# Patient Record
Sex: MALE | URBAN - METROPOLITAN AREA
[De-identification: names, ages, dates, MRNs, and addresses within clinical notes are randomized per-mention and may not be internally consistent; named-entity substitution may affect disease eponyms.]

---

## 2017-10-20 ENCOUNTER — APPOINTMENT (RX ONLY)
Dept: URBAN - METROPOLITAN AREA CLINIC 126 | Facility: CLINIC | Age: 67
Setting detail: DERMATOLOGY
End: 2017-10-20

## 2017-10-20 DIAGNOSIS — Z41.9 ENCOUNTER FOR PROCEDURE FOR PURPOSES OTHER THAN REMEDYING HEALTH STATE, UNSPECIFIED: ICD-10-CM

## 2017-10-20 PROCEDURE — ? RECOMMENDATIONS

## 2017-10-20 PROCEDURE — ? MEDICAL CONSULTATION: BROWN SPOTS

## 2017-10-20 PROCEDURE — 99025: CPT

## 2017-11-06 ENCOUNTER — APPOINTMENT (RX ONLY)
Dept: URBAN - METROPOLITAN AREA CLINIC 126 | Facility: CLINIC | Age: 67
Setting detail: DERMATOLOGY
End: 2017-11-06

## 2017-11-06 DIAGNOSIS — Z41.9 ENCOUNTER FOR PROCEDURE FOR PURPOSES OTHER THAN REMEDYING HEALTH STATE, UNSPECIFIED: ICD-10-CM

## 2017-11-06 PROCEDURE — ? SCITON BBL

## 2017-11-06 PROCEDURE — ? DIAGNOSIS COMMENT

## 2017-11-06 ASSESSMENT — LOCATION SIMPLE DESCRIPTION DERM
LOCATION SIMPLE: LEFT HAND
LOCATION SIMPLE: RIGHT CHEEK
LOCATION SIMPLE: RIGHT HAND
LOCATION SIMPLE: LEFT CHEEK

## 2017-11-06 ASSESSMENT — LOCATION DETAILED DESCRIPTION DERM
LOCATION DETAILED: RIGHT CENTRAL MALAR CHEEK
LOCATION DETAILED: LEFT SUPERIOR CENTRAL MALAR CHEEK
LOCATION DETAILED: RIGHT RADIAL DORSAL HAND
LOCATION DETAILED: LEFT ULNAR DORSAL HAND

## 2017-11-06 ASSESSMENT — LOCATION ZONE DERM
LOCATION ZONE: HAND
LOCATION ZONE: FACE

## 2017-11-06 NOTE — PROCEDURE: SCITON BBL
Cooling (In C): 25
Post Procedure Text: The patient tolerated the procedure well. Post care was reviewed with the patient. The patient understands multiple treatments are necessary.
Pulse Duration: [address] [facility]
Repetition Rate (Hz): 10
Pulse Duration: 20
Passes: 1
Cooling ?: Yes
Cooling (In C): 15
Pulse Duration Units: milliseconds
Preprocedure Text: Proper eye protection was applied. The treatment areas were thoroughly cleaned. Clear ultrasound gel was applied to the treatment area. The area was treated with no immediate stacking of pulses. End treatment erythema was as expected.
Consent: Written consent obtained, risks reviewed including but not limited to crusting, scabbing, blistering, scarring, darker or lighter pigmentary change, bruising, and/or incomplete response.
Spot Size: Finesse Adapter Size: 15 x 15 mm square
Treated Area: large area
Anesthesia Volume In Cc: 0
Detail Level: Zone
Post-Care Instructions: I reviewed with the patient in detail post-care instructions. Patient should stay away from the sun and wear sun protection until treated areas are fully healed.
Fluence (J/Cm2): 12
Anesthesia Type: 1% lidocaine with epinephrine
Spot Size: Finesse Adapter Size: 11 mm round
Length Of Topical Anesthesia Application (Optional): 15 minutes
Post Procedure Text: The patient tolerated the procedure well.  Post care was reviewed with the patient. The patient understands multiple treatments are necessary.
Topical Anesthesia?: BLT cream (benzocaine 20%, lidocaine 6%, tetracaine 4%)
Spot Size: Finesse Adapter Size: 15 x 45 mm (No Finesse Adapter)
Additional Comments (Optional): 9/15/515/15Â° on distal FAs
Preprocedure Text: Proper eye protection was applied.  The treatment areas were thoroughly cleaned.  Clear ultrasound gel was applied to the treatment area. The area was treated with no immediate stacking of pulses.  End treatment erythema was as expected.

## 2017-11-29 ENCOUNTER — APPOINTMENT (RX ONLY)
Dept: URBAN - METROPOLITAN AREA CLINIC 126 | Facility: CLINIC | Age: 67
Setting detail: DERMATOLOGY
End: 2017-11-29

## 2017-11-29 DIAGNOSIS — Z41.9 ENCOUNTER FOR PROCEDURE FOR PURPOSES OTHER THAN REMEDYING HEALTH STATE, UNSPECIFIED: ICD-10-CM

## 2017-11-29 PROCEDURE — 99025: CPT

## 2017-11-29 PROCEDURE — ? MEDICAL CONSULTATION: BROWN SPOTS

## 2017-12-21 ENCOUNTER — APPOINTMENT (RX ONLY)
Dept: URBAN - METROPOLITAN AREA CLINIC 126 | Facility: CLINIC | Age: 67
Setting detail: DERMATOLOGY
End: 2017-12-21

## 2017-12-21 DIAGNOSIS — L82.1 OTHER SEBORRHEIC KERATOSIS: ICD-10-CM

## 2017-12-21 DIAGNOSIS — Z41.9 ENCOUNTER FOR PROCEDURE FOR PURPOSES OTHER THAN REMEDYING HEALTH STATE, UNSPECIFIED: ICD-10-CM

## 2017-12-21 PROCEDURE — ? SCITON BBL

## 2017-12-21 PROCEDURE — ? LIQUID NITROGEN (COSMETIC)

## 2017-12-21 PROCEDURE — ? DIAGNOSIS COMMENT

## 2017-12-21 ASSESSMENT — LOCATION DETAILED DESCRIPTION DERM
LOCATION DETAILED: LEFT MEDIAL FOREHEAD
LOCATION DETAILED: LEFT DORSAL SMALL METACARPOPHALANGEAL JOINT
LOCATION DETAILED: RIGHT ULNAR DORSAL HAND
LOCATION DETAILED: LEFT CENTRAL MALAR CHEEK
LOCATION DETAILED: LEFT RADIAL DORSAL HAND
LOCATION DETAILED: RIGHT RADIAL DORSAL HAND
LOCATION DETAILED: LEFT ULNAR DORSAL HAND
LOCATION DETAILED: LEFT LATERAL FOREHEAD
LOCATION DETAILED: LEFT ANTERIOR PROXIMAL THIGH
LOCATION DETAILED: RIGHT CENTRAL MALAR CHEEK

## 2017-12-21 ASSESSMENT — LOCATION ZONE DERM
LOCATION ZONE: FACE
LOCATION ZONE: LEG
LOCATION ZONE: HAND

## 2017-12-21 ASSESSMENT — LOCATION SIMPLE DESCRIPTION DERM
LOCATION SIMPLE: LEFT HAND
LOCATION SIMPLE: RIGHT HAND
LOCATION SIMPLE: LEFT THIGH
LOCATION SIMPLE: LEFT FOREHEAD
LOCATION SIMPLE: LEFT CHEEK
LOCATION SIMPLE: RIGHT CHEEK

## 2017-12-21 NOTE — PROCEDURE: LIQUID NITROGEN (COSMETIC)
Detail Level: Zone
Render Post-Care Instructions In Note?: no
Post-Care Instructions: I reviewed with the patient in detail post-care instructions. Patient is to wear sunprotection, and avoid picking at any of the treated lesions. Pt may apply Vaseline to crusted or scabbing areas.
Price (Use Numbers Only, No Special Characters Or $): [address] [facility]
Consent: The patient's consent was obtained including but not limited to risks of crusting, scabbing, blistering, scarring, darker or lighter pigmentary change, recurrence, incomplete removal and infection. The patient understands that the procedure is cosmetic in nature and is not covered by insurance.

## 2021-10-18 NOTE — PROCEDURE: SCITON BBL
soft, nontender, nondistended , normal bowel sounds
Spot Size: Finesse Adapter Size: 15 x 15 mm square
Pulse Duration: 10
Spot Size: Finesse Adapter Size: 11 mm round
Detail Level: Zone
Repetition Rate (Hz): 0
Fluence (J/Cm2): [address]
Pulse Duration: [address] [facility]
Post-Care Instructions: I reviewed with the patient in detail post-care instructions. Patient should stay away from the sun and wear sun protection until treated areas are fully healed.
Preprocedure Text: Proper eye protection was applied. The treatment areas were thoroughly cleaned. Clear ultrasound gel was applied to the treatment area. The area was treated with no immediate stacking of pulses. End treatment erythema was as expected.
Treatment Number: 1
Cooling (In C): 15
Pulse Duration Units: milliseconds
Consent: Written consent obtained, risks reviewed including but not limited to crusting, scabbing, blistering, scarring, darker or lighter pigmentary change, bruising, and/or incomplete response.
Pulse Duration: 20
Fluence (J/Cm2): 25
Fluence (J/Cm2): 12
Post Procedure Text: The patient tolerated the procedure well. Post care was reviewed with the patient. The patient understands multiple treatments are necessary.
Cooling ?: Yes
Spot Size: Finesse Adapter Size: 15 x 45 mm (No Finesse Adapter)
Treated Area: large area

## 2022-01-05 ENCOUNTER — IMPORTED ENCOUNTER (OUTPATIENT)
Dept: URBAN - METROPOLITAN AREA CLINIC 31 | Facility: CLINIC | Age: 72
End: 2022-01-05

## 2022-01-05 PROBLEM — H25.13: Noted: 2022-01-05

## 2022-01-05 PROBLEM — H53.022: Noted: 2022-01-05

## 2022-01-05 PROBLEM — H25.11: Noted: 2022-01-05

## 2022-01-05 PROCEDURE — 99204 OFFICE O/P NEW MOD 45 MIN: CPT

## 2022-01-05 PROCEDURE — 92015 DETERMINE REFRACTIVE STATE: CPT

## 2022-01-05 NOTE — PATIENT DISCUSSION
1.  Amblyopia: Monitor vision limited in affected eye. 2. Nuclear Sclerotic Cataract OU: Explained how cataracts can effect vision. Recommend clinical observation. The patient was advised to contact us if any change or worsening of vision. final glasses rx given today. Return for an appointment in 1 year for comprehensive exam. with Dr. Anthony Garcia.

## 2022-04-02 ASSESSMENT — VISUAL ACUITY
OS_CC: 20/400
OS_SC: 20/400
OD_SC: 20/20-2
OD_CC: 20/30-1

## 2022-04-02 ASSESSMENT — TONOMETRY
OS_IOP_MMHG: 15
OD_IOP_MMHG: 14

## 2023-09-17 NOTE — PATIENT DISCUSSION
No swelling no crepitus FROM/normal Retinal tear and detachment warning symptoms reviewed and patient instructed to call immediately if increasing floaters, flashes, or decreasing peripheral vision.